# Patient Record
Sex: FEMALE | Race: AMERICAN INDIAN OR ALASKA NATIVE
[De-identification: names, ages, dates, MRNs, and addresses within clinical notes are randomized per-mention and may not be internally consistent; named-entity substitution may affect disease eponyms.]

---

## 2018-08-29 ENCOUNTER — HOSPITAL ENCOUNTER (EMERGENCY)
Dept: HOSPITAL 11 - JP.ED | Age: 23
Discharge: HOME | End: 2018-08-29
Payer: COMMERCIAL

## 2018-08-29 VITALS — DIASTOLIC BLOOD PRESSURE: 73 MMHG | SYSTOLIC BLOOD PRESSURE: 126 MMHG

## 2018-08-29 DIAGNOSIS — F17.210: ICD-10-CM
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
## 2018-08-29 DIAGNOSIS — S29.011A: Primary | ICD-10-CM

## 2018-08-29 PROCEDURE — 96372 THER/PROPH/DIAG INJ SC/IM: CPT

## 2018-08-29 PROCEDURE — 99284 EMERGENCY DEPT VISIT MOD MDM: CPT

## 2018-08-29 RX ADMIN — KETOROLAC TROMETHAMINE ONE MG: 60 INJECTION, SOLUTION INTRAMUSCULAR at 23:13

## 2018-08-29 NOTE — EDM.PDOC
ED HPI GENERAL MEDICAL PROBLEM





- General


Chief Complaint: Flank Pain


Stated Complaint: RIBS HURT/HURT ON TRAMPOLINE


Time Seen by Provider: 08/29/18 22:50


Source of Information: Reports: Patient


History Limitations: Reports: No Limitations





- History of Present Illness


INITIAL COMMENTS - FREE TEXT/NARRATIVE: 





22-year-old female was jumping on a trampoline earlier today and "jumped too 

high" and strained her ribs bilaterally. She did not fall or strike her chest 

wall. Now it hurts to breathe because of tenderness in the chest wall laterally 

and in the flank area especially on the left side. No shortness of breath no 

abdominal pain. She has not taking anything for pain because her mom won't let 

her take ibuprofen because she takes "too much".


Onset: Sudden (7 or 8 hours ago while jumping on the trampoline)


Severity: Mild


Worsens with: Reports: Other (Breathing), Movement


  ** bilateral rib pain


Pain Score (Numeric/FACES): 10





- Related Data


 Allergies











Allergy/AdvReac Type Severity Reaction Status Date / Time


 


No Known Allergies Allergy   Verified 08/29/18 22:54











Home Meds: 


 Home Meds





NK [No Known Home Meds]  04/05/14 [History]











Past Medical History





- Past Health History


Medical/Surgical History: Denies Medical/Surgical History





Social & Family History





- Tobacco Use


Smoking Status *Q: Current Every Day Smoker


Years of Tobacco use: 5


Packs/Tins Daily: 0.5


Second Hand Smoke Exposure: No





- Caffeine Use


Caffeine Use: Reports: Soda





- Recreational Drug Use


Recreational Drug Use: No





ED ROS GENERAL





- Review of Systems


Review Of Systems: See Below


Constitutional: Denies: Fever, Chills


HEENT: Reports: No Symptoms


Respiratory: Reports: Pleuritic Chest Pain.  Denies: Shortness of Breath, Cough


Cardiovascular: Denies: Palpitations


GI/Abdominal: Denies: Abdominal Pain, Nausea, Vomiting





ED EXAM, GENERAL





- Physical Exam


Exam: See Below


Exam Limited By: No Limitations


General Appearance: Alert, No Apparent Distress


Head: Atraumatic


Neck: Supple, Non-Tender


Respiratory/Chest: No Respiratory Distress, Lungs Clear, Other (She reacts with 

tenderness to palpation of the left flank area and lateral chest wall 

bilaterally. There is no crepitus, asymmetry or bruising.)


Neurological: Alert, Oriented


Psychiatric: Normal Mood, Flat Affect


Skin Exam: Warm, Dry





Course





- Vital Signs


Last Recorded V/S: 


 Last Vital Signs











Temp  97.9 F   08/29/18 22:53


 


Pulse  99   08/29/18 22:53


 


Resp  18   08/29/18 22:53


 


BP  126/73   08/29/18 22:53


 


Pulse Ox  99   08/29/18 22:53














- Orders/Labs/Meds


Meds: 


Medications














Discontinued Medications














Generic Name Dose Route Start Last Admin





  Trade Name Musa  PRN Reason Stop Dose Admin


 


Ketorolac Tromethamine  60 mg  08/29/18 23:05  08/29/18 23:13





  Toradol  IM  08/29/18 23:06  60 mg





  ONETIME ONE   Administration





     





     





     





     














- Re-Assessments/Exams


Free Text/Narrative Re-Assessment/Exam: 





08/29/18 23:09


Patient has some strain intercostal muscles of the chest wall, was given 60 mg 

of IM Toradol and 10 additional doses to take 3 doses a day. Increase activity 

as tolerated and recheck in 2-3 days if not improving.





Departure





- Departure


Time of Disposition: 23:28


Disposition: Home, Self-Care 01


Condition: Good


Clinical Impression: 


Muscle strain of chest wall


Qualifiers:


 Encounter type: initial encounter Qualified Code(s): S29.011A - Strain of 

muscle and tendon of front wall of thorax, initial encounter








- Discharge Information


Instructions:  Muscle Strain, Easy-to-Read


Referrals: 


PCP,None [Primary Care Provider] - 


Forms:  ED Department Discharge


Care Plan Goals: 


Starting tomorrow take 1 dose of pain medication every 6-8 hours until gone. 

Ice to sore areas may help the next 2 days, and increase activity as tolerated. 

Recheck in 2-3 days if not improving satisfactorily, or return sooner if 

worsening such as increasing shortness of breath or fever.